# Patient Record
Sex: MALE | Race: WHITE
[De-identification: names, ages, dates, MRNs, and addresses within clinical notes are randomized per-mention and may not be internally consistent; named-entity substitution may affect disease eponyms.]

---

## 2018-01-10 ENCOUNTER — HOSPITAL ENCOUNTER (OUTPATIENT)
Dept: HOSPITAL 96 - M.PC | Age: 53
End: 2018-01-10
Payer: MEDICARE

## 2018-01-10 DIAGNOSIS — M06.842: ICD-10-CM

## 2018-01-10 DIAGNOSIS — M06.841: ICD-10-CM

## 2018-01-10 DIAGNOSIS — Z79.899: ICD-10-CM

## 2018-01-10 DIAGNOSIS — M06.852: ICD-10-CM

## 2018-01-10 DIAGNOSIS — M06.851: ICD-10-CM

## 2018-01-10 DIAGNOSIS — M54.16: Primary | ICD-10-CM

## 2018-01-12 NOTE — PAINCON
[address]  [address]                    PAIN MANAGEMENT CONSULTATION  
_______________________________________________________________________________
 
Name:       ALEXANDER BACON             Room:                      REG CHLOE MODI#:  [id number]      Account #:      [id number]  
Admission:  01/10/18     Attend Phys:    ANTONIA Desai
Discharge:               Date of Birth:  [birth year]  
         Report #: [id number]
                                                                     [id number]  
_______________________________________________________________________________
THIS REPORT FOR:  //name//                      
 
CC: Bao Singh
 
DATE OF SERVICE:  01/10/2018
 
 
The patient is a 53-year-old gentleman, typically treated for SI mediated pain,
history of lumbar radiculopathy, rheumatoid arthritis affecting hands and hips,
requiring high risk complex medication management.
 
The patient was last seen in pain clinic 11/15/2017, continued on Suboxone 8/2
once a day.
 
He returns to pain clinic today for prolonged visit, was seen from 8:00 a.m. to
8:25, greater than 50% of this visit was spent counseling the patient.
 
Incidentally, he has had an upper respiratory infection, which seems to be
resolving.  He had fallen in late October or early November with increasing low
back pain.  He is status post right percutaneous SI joint fusion (iFuse).  He
feels the pain is getting worse on the left side.  He has pain over the tailbone
as well (coccydynia?).  Some ongoing pain in the right shoulder.  I had him see
orthopedics at [facility], orthopedist at the time did an MRI and found
that there was actually no rotator cuff tear and he is now doing physical
therapy for same.
 
Again, with the patient's recent viral "bug" he has had upper respiratory issues
as well as some nausea, vomiting and diarrhea.  Due to perirectal irritation
from the diarrhea, we deferred SI exam today.  The patient does, however, have
primarily SI mediated pain on that left side, though there may be a component of
coccydynia.
 
PHYSICAL EXAMINATION:  Reveals a 5 feet 11 inches, 210 pound gentleman, BMI is
29.3 kilograms per meter squared.  Blood pressure 133/85, pulse 100,
respirations 16.  Rises from chair using armrests, again moderately antalgic
gait favoring the left leg, tenderness over the left SI joint.  Again, palpation
of the coccyx was deferred.  Lower extremity strength is generally symmetric. 
Right shoulder has diminished range of motion.
 
We did discuss at length opiate habituation and abuse concerns.  The patient has
otherwise been stable on his opiate analgesics, though was positive for THC last
year.  He has eschewed marijuana use and has been in counseling for about 6
months now.
 
Per our discussion last visit, we have elected to rotate back to OxyContin 15 mg
 
 
 
[place] [address]                    PAIN MANAGEMENT CONSULTATION  
_______________________________________________________________________________
 
Name:       ALEXANDER BACON             Room:                      REG CLI 
M.R.#:  [id number]      Account #:      [id number]  
Admission:  01/10/18     Attend Phys:    ANTONIA Desai
Discharge:               Date of Birth:  [birth year]  
         Report #: [id number]
                                                                     [id number]  
_______________________________________________________________________________
b.i.d., roughly equivalent to his Suboxone 8/2 mg from an analgesic standpoint. 
Cost concern was a significant factor, Suboxone was $200 a month and the
oxycodone will be $15.  We will trial this for 3 days and evaluate.  He asked
for a breakthrough medicine and we have elected to trial Celebrex 200 mg to be
used on a p.r.n. basis, not more than 1 a day for "breakthrough pain."
 
He will follow up in 1 month for reevaluation.  He has had x-rays of the sacrum,
which showed no fracture.  Next visit, we will consider doing either a left SI
joint injection or caudal injection for coccydynia depending on primary pain
generator as evaluated at that visit.
 
Discharged in good and stable condition after 25-minute visit, and greater than
50% of time spent in counseling the patient.
 
 
 
 
 
 
 
 
 
 
 
 
 
 
 
 
 
 
 
 
 
 
 
 
 
 
 
 
 
 
 
<ELECTRONICALLY SIGNED>
                                        By:  Lenin Singh DO        
01/12/18     0959
D: 01/10/18 1229_______________________________________
T: 01/10/18 1759Lenin Singh DO           /nt

## 2018-02-07 ENCOUNTER — HOSPITAL ENCOUNTER (OUTPATIENT)
Dept: HOSPITAL 96 - M.PC | Age: 53
Discharge: HOME | End: 2018-02-07
Payer: MEDICARE

## 2018-02-07 DIAGNOSIS — M06.862: ICD-10-CM

## 2018-02-07 DIAGNOSIS — Z79.891: ICD-10-CM

## 2018-02-07 DIAGNOSIS — M54.16: Primary | ICD-10-CM

## 2018-02-07 DIAGNOSIS — Z79.899: ICD-10-CM

## 2018-02-07 DIAGNOSIS — M06.851: ICD-10-CM

## 2018-02-07 DIAGNOSIS — M06.852: ICD-10-CM

## 2018-02-07 DIAGNOSIS — M53.3: ICD-10-CM

## 2018-02-07 DIAGNOSIS — M06.861: ICD-10-CM

## 2018-02-12 NOTE — PAINCON
[facility] 
[address] [place]  [address]                    PAIN MANAGEMENT CONSULTATION  
_______________________________________________________________________________
 
Name:       ALEXANDER BACON             Room:                      REG CHLOE MODI#:  [id number]      Account #:      [id number]  
Admission:  02/07/18     Attend Phys:    ANTONIA Desai
Discharge:               Date of Birth:  [birth year]  
         Report #: [id number]
                                                                     [id number]  
_______________________________________________________________________________
THIS REPORT FOR:  //name//                      
 
CC: Bao Singh
 
DATE OF SERVICE:  02/07/2018
 
 
The patient is a pleasant 53-year-old gentleman, long treated for lumbar
radiculopathy, SI joint dysfunction, history of rheumatoid arthritis affecting
hips and knees, requiring high risk complex medication management.  Last seen in
pain clinic on 01/10/2018.  We had rotated the patient back to OxyContin 15 mg
b.i.d. and he has done relatively well on this.  He returns to pain clinic today
noting that coccygeal pain remains problematic.  Last visit, he noted he had
fallen backwards striking his "tailbone" with ongoing pain.  We talked about
proceeding with a caudal injection today if symptoms do not improve, and they
have not.
 
The patient returns to pain clinic today noting pain remains problematic, rates
it a 3/10, but notes he has trouble sitting for any period of time.  He had
followed up with a general practice physician after his prior fall, x-rays had
shown no pathology in the tailbone though there may be some subtle dislocation,
which may be better reviewed by MRI.
 
Lower extremity strength is preserved, but with tenderness across the low back
and pelvis.  He does have some exacerbation of his chronic radicular symptoms. 
Today, we elected to move forward with caudal injection under fluoroscopy.
 
We reviewed the fact that opiate medications are being used to provide analgesia
adequate to support activities of daily living, not attempting to achieve a
specific pain score on the 0-10 Visual Analog Scale.  The current opiate
medications are providing sufficient analgesia to allow the patient to
participate in activities of daily living.  The patient is not exhibiting any
aberrant behavior suggestive of drug diversion.  The patient is not having any
adverse reactions to medications.  The patient is not suffering from daytime
somnolence or mental acuity changes.  The patient is managing opiate-induced
constipation with appropriate over-the-counter agents and dietary
considerations.  The patient was counseled on concern for caution with operating
a motor vehicle while using opiate medications.
 
A physical exam was performed and the patient's functional status was evaluated.
 All patients with back pain were advised against the bed rest greater than 4
days and were advised to return to normal activities.  Pain score assessment was
noted and the treatment plan was reviewed with the patient.  All current
medications, both prescribed and OTC were reviewed and reconciled on the
electronic medical record.  Tobacco screening was accomplished and smoking
 
 
 
[place] [address]                    PAIN MANAGEMENT CONSULTATION  
_______________________________________________________________________________
 
Name:       ALEXANDER BACON             Room:                      REG CLLIS MODI#:  [id number]      Account #:      [id number]  
Admission:  02/07/18     Attend Phys:    ANTONIA Desai
Discharge:               Date of Birth:  [birth year]  
         Report #: [id number]
                                                                     [id number]  
_______________________________________________________________________________
cessation was advised when indicated.  BMI was noted and diet/exercise
modification was recommended for all patients following outside normal
parameters.
 
I reviewed with the patient today their responsibilities to safeguard
prescription medications, reviewed their responsibility to utilize medications
only as prescribed by the physician.  They are to seek and receive pain
medications only from 1 physician group ( [facility]).  They are to use 1
pharmacy and keep the clinic informed if they change pharmacies.  Their
responsibilities include making followup visits in a timely fashion and to avoid
abrupt discontinuation of medication usage.  Their responsibilities further
include bringing their medications (bottles from the pharmacy with residual
pills) to the visit for possible confirmation of pill counts and the patient
understands it is their responsibility to submit to random drug screens to
ensure both that the medications prescribed are present, and that no other
controlled substances are present.  All prescriptions provided today were
generated electronically.
 
ASSESSMENT:  Lumbar radiculopathy, sacroiliac joint dysfunction, rheumatoid
arthritis affecting hips and knees, requiring high risk complex medication
management.
 
RECOMMENDATION:
1.  Buccal swab today.  No aberrant behavior suggestive for drug diversion,
simply complying with opiate consent to treat contract, it has been greater than
a year since our last random drug screen.
2.  Renew OxyContin 15 mg b.i.d.  I have taken the liberty of writing for 2
months of current medication.
3.  Caudal injection under fluoroscopy today.
 
PROCEDURE NOTE:  Caudal injection under fluoroscopy.
 
DESCRIPTION OF PROCEDURE:  After informed consent was obtained, the patient was
taken to the fluoroscopy suite and placed in prone position.  After sterile prep
and drape, skin was raised.  A 22-gauge stylet needle was placed through the
sacral hiatus into caudal epidural space.  Negative aspiration was accomplished.
 1 mL of Omnipaque was injected, which showed spread in the epidural space. 
This was followed with 80 mg triamcinolone plus 3 mL of 1% preservative-free
Xylocaine injected into the caudal epidural space.  Needle was removed.  The
area was cleansed, Band-Aids applied.  The patient monitored for an appropriate
period of time, discharged in good and stable condition.  Fluoroscopy time was
under 15 seconds.
 
 
<ELECTRONICALLY SIGNED>
                                        By:  Lenin Singh DO        
02/12/18     0721
D: 02/07/18 1353_______________________________________
T: 02/07/18 2225[facility] DO           /nt

## 2018-04-04 ENCOUNTER — HOSPITAL ENCOUNTER (OUTPATIENT)
Dept: HOSPITAL 96 - M.PC | Age: 53
End: 2018-04-04
Payer: MEDICARE

## 2018-04-04 DIAGNOSIS — M16.0: ICD-10-CM

## 2018-04-04 DIAGNOSIS — Z79.899: ICD-10-CM

## 2018-04-04 DIAGNOSIS — M54.16: Primary | ICD-10-CM

## 2018-04-04 DIAGNOSIS — G89.29: ICD-10-CM

## 2018-04-09 NOTE — PAINCON
[facility] 
[address] [place]  [address]                    PAIN MANAGEMENT CONSULTATION  
_______________________________________________________________________________
 
Name:       ALEXANDER BACON             Room:                      REG CHLOE MODI#:  [id number]      Account #:      [id number]  
Admission:  04/04/18     Attend Phys:    ANTONIA Desai
Discharge:               Date of Birth:  [birth year]  
         Report #: [id number]
                                                                     [id number]  
_______________________________________________________________________________
THIS REPORT FOR:  //name//                      
 
CC: Bao Singh
 
The patient is a pleasant 53-year-old gentleman being treated for symptomatic
lumbar radiculopathy, SI mediated pain, rheumatoid arthritis affecting hips and
knees, requiring complex medication management.  Last visit on 02/07/2018,
buccal swab at that time was positive for prescribed medications and no others
(oxycodone).  The patient continues on oxycodone 15 mg b.i.d.  We did a caudal
epidural injection at last visit with significant improvement in baseline pain. 
Discontinued Celebrex due to lack of efficacy.  Continued Remeron 30 mg ODT at
bedtime.  Returns to pain clinic today again noting injection afforded near 100%
relief for about 6 weeks, pain has gradually begun to recur.  He has been
sitting on a "SI pad."  Basically, this is a foam pad with a hole at the
posterior midline to relieve pain over the coccyx.  The patient states that this
has been efficacious.
 
Notes medications are helpful to enable the patient to participate in activities
of daily living.  No problems with daytime somnolence, mental acuity changes and
constipation.  The patient's position of comfort is standing.  Gait is tandem. 
Lower extremity strength is preserved.  Diffuse tenderness across the low back. 
No discrete trigger points are noted.
 
Vital signs are stable as noted in the chart.
 
We reviewed the fact that opiate medications are being used to provide analgesia
adequate to support activities of daily living, not attempting to achieve a
specific pain score on the 0-10 Visual Analog Scale.  The current opiate
medications are providing sufficient analgesia to allow the patient to
participate in activities of daily living.  The patient is not exhibiting any
aberrant behavior suggestive of drug diversion.  The patient is not having any
adverse reactions to medications.  The patient is not suffering from daytime
somnolence or mental acuity changes.  The patient is managing opiate-induced
constipation with appropriate over-the-counter agents and dietary
considerations. The patient was counseled on concern for caution with operating
a motor vehicle while using opiate medications.
 
A physical exam was performed and the patient's functional status was evaluated.
 All patients with back pain were advised against the bed rest greater than 4
days and were advised to return to normal activities.  Pain score assessment was
noted and the treatment plan was reviewed with the patient.  All current
medications, both prescribed and OTC were reviewed and reconciled on the
electronic medical record.  Tobacco screening was accomplished and smoking
cessation was advised when indicated.  BMI was noted and diet/exercise
modification was recommended for all patients following outside normal
 
 
 
[facility] 
[address] [place] [address]                    PAIN MANAGEMENT CONSULTATION  
_______________________________________________________________________________
 
Name:       ALEXANDER BACON             Room:                      REG CLI 
MKathrinaKthrin#:  [id number]      Account #:      [id number]  
Admission:  04/04/18     Attend Phys:    ANTONIA Desai
Discharge:               Date of Birth:  [birth year]  
         Report #: [id number]
                                                                     [id number]  
_______________________________________________________________________________
parameters.
 
I reviewed with the patient today their responsibilities to safeguard
prescription medications, reviewed their responsibility to utilize medications
only as prescribed by the physician.  They are to seek and receive pain
medications only from 1 physician group ([facility]).  They are to use
1 pharmacy and keep the clinic informed if they change pharmacies.  Their
responsibilities include making followup visits in a timely fashion and to avoid
abrupt discontinuation of medication usage.  Their responsibilities further
include bringing their medications (bottles from the pharmacy with residual
pills) to the visit for possible confirmation of pill counts and the patient
understands it is their responsibility to submit to random drug screens to
ensure both that the medications prescribed are present, and that no other
controlled substances are present.  All prescriptions provided today were
generated electronically.
 
ASSESSMENT:  Chronic axial back pain status post lumbar decompressive
laminectomy and fusion, sacroiliac mediated pain, requiring complex medication
management, stable on baseline medication.
 
RECOMMENDATION:  Renew oxycodone 15 mg b.i.d.  I have taken the liberty of
writing for 2 months of current medication.  Continue Remeron 30 mg ODT at
bedtime.  Follow up in 2 months for reevaluation.  Consider caudal epidural
injection as needed.
 
 
 
 
 
 
 
 
 
 
 
 
 
 
 
 
 
 
 
 
<ELECTRONICALLY SIGNED>
                                        By:  Lenin Singh DO        
04/09/18     0802
D: 04/04/18 1533_______________________________________
T: 04/04/18 2058Lenin Singh DO           /nt

## 2018-05-30 ENCOUNTER — HOSPITAL ENCOUNTER (OUTPATIENT)
Dept: HOSPITAL 96 - M.PC | Age: 53
End: 2018-05-30
Payer: MEDICARE

## 2018-05-30 DIAGNOSIS — M53.3: ICD-10-CM

## 2018-05-30 DIAGNOSIS — F41.1: ICD-10-CM

## 2018-05-30 DIAGNOSIS — G47.00: ICD-10-CM

## 2018-05-30 DIAGNOSIS — Z79.899: ICD-10-CM

## 2018-05-30 DIAGNOSIS — M06.89: Primary | ICD-10-CM

## 2018-05-30 DIAGNOSIS — M54.5: ICD-10-CM

## 2018-05-31 NOTE — PAINCON
[address]  [address]                    PAIN MANAGEMENT CONSULTATION  
_______________________________________________________________________________
 
Name:       ALEXANDER BACON             Room:                      REG CHLOE MODI#:  [id number]      Account #:      [id number]  
Admission:  05/30/18     Attend Phys:    ANTONIA Desai
Discharge:               Date of Birth:  [birth year]  
         Report #: [id number]
                                                                     [id number]  
_______________________________________________________________________________
THIS REPORT FOR:  //name//                      
 
CC: Bao Singh
 
DATE OF SERVICE:  05/30/2018
 
 
The patient is a 53-year-old gentleman long known to the pain clinic, typically
treated for lumbar radiculopathy, SI mediated pain, rheumatoid arthritis
affecting hips and knees, requiring complex medication management.  Last seen in
pain clinic 04/04/2018.  The patient was continued on oxycodone 15 mg b.i.d.,
Remeron 30 mg ODT (oral disintegrating tablet) at bedtime.  The patient has been
remarkably stable on current medication.  He tells me that his tailbone pain,
however, has become more and more problematic.  With sitting, he has significant
pain over the tailbone and concerningly, he tells me he is having some trouble
with bowel movements.  He tells me he has a hard time defecating and "cleaning
himself afterwards."  On further questioning, it sounds like he is simply
struggling with some hard stools.  Nonetheless, with increased coccydynia,
concern for myelopathic issues.  He does not appear to have saddle anesthesia
nor true bowel or bladder continence changes and no loss of proprioception of
the lower extremities.  We will order an MRI of the pelvis.  I will ask that a
copy of that result also goes to Dr. Ybarra.  We will want to look for any
obvious osseous pathology in the sacrum or coccyx.  May benefit from referral to
Neurosurgery, Juan Pablo Estrada has done multiple coccygectomies for specific
"tailbone" pain.  If, however, we can avoid surgery, though obviously be ideal.
 
The patient has had a right percutaneous SI joint fusion, which helped with some
of his SI mediated pain.  Last caudal injection 02/07/2017 did help some
decrease his overall tailbone pain.
 
We reviewed the fact that opiate medications are being used to provide analgesia
adequate to support activities of daily living, not attempting to achieve a
specific pain score on the 0-10 Visual Analog Scale.  The current opiate
medications are providing sufficient analgesia to allow the patient to
participate in activities of daily living.  The patient is not exhibiting any
aberrant behavior suggestive of drug diversion.  The patient is not having any
adverse reactions to medications.  The patient is not suffering from daytime
somnolence or mental acuity changes.  The patient is managing opiate-induced
constipation with appropriate over-the-counter agents and dietary
considerations. The patient was counseled on concern for caution with operating
a motor vehicle while using opiate medications.
A physical exam was performed and the patient's functional status was evaluated.
 All patients with back pain were advised against the bed rest greater than 4
days and were advised to return to normal activities.  Pain score assessment was
 
 
 
[facility] 
[address]  [place] [address]                    PAIN MANAGEMENT CONSULTATION  
_______________________________________________________________________________
 
Name:       ALEXANDER BACON             Room:                      REG VINNIE 
LY#:  [id number]      Account #:      [id number]  
Admission:  05/30/18     Attend Phys:    ANTONIA Desai
Discharge:               Date of Birth:  [birth year]  
         Report #: [id number]
                                                                     [id number]  
_______________________________________________________________________________
noted and the treatment plan was reviewed with the patient.  All current
medications, both prescribed and OTC were reviewed and reconciled on the
electronic medical record.  Tobacco screening was accomplished and smoking
cessation was advised when indicated.  BMI was noted and diet/exercise
modification was recommended for all patients following outside normal
parameters.
I reviewed with the patient today their responsibilities to safeguard
prescription medications, reviewed their responsibility to utilize medications
only as prescribed by the physician.  They are to seek and receive pain
medications only from 1 physician group (  [facility]).  They are to use
1 pharmacy and keep the clinic informed if they change pharmacies.  Their
responsibilities include making followup visits in a timely fashion and to avoid
abrupt discontinuation of medication usage.  Their responsibilities further
include bringing their medications (bottles from the pharmacy with residual
pills) to the visit for possible confirmation of pill counts and the patient
understands it is their responsibility to submit to random drug screens to
ensure both that the medications prescribed are present, and that no other
controlled substances are present.  All prescriptions provided today were
generated electronically.
 
PHYSICAL EXAMINATION:  Does show a 53-year-old gentleman, BMI is 30.1 kilograms
per meter squared.  Blood pressure 136/73, pulse 98, respirations are 18. 
Subjective pain score is nominal 2/10, but notes pain is more problematic when
he sits.  Alert and oriented to person, place and time, judged to be a
reasonable historian.  Rises from chair using the armrest.  Gait is tandem at
this time.  Diffuse tenderness across the low back.  No discrete trigger points
are noted.  Lower extremity strength is preserved.  Straight leg raise is
negative.
 
X-ray of the tailbone had shown no acute fracture of the tailbone.
 
ASSESSMENT:  Coccydynia, lumbar radicular pain, history of SI mediated pain,
rheumatoid arthritis affecting hips and knees, complex medication management on
low dose opiate, oxycodone 15 mg b.i.d. equating to approximately 45 mg of
morphine daily, well within the 0-50 low dose range (50-90 moderate dose and
plus 90 mg morphine equivalent high dose).  He does use Remeron oral
disintegrating tablet 30 mg at bedtime to help with insomnia and chronic
anxiety.
 
RECOMMENDATION:  We will ask Dr. Ybarra to continue managing the patient's
opiate analgesic, I am leaving the practice area and the patient has been stable
on this agent.  If, however, Dr. Ybarra is not comfortable writing for
oxycodone extended release 15 mg b.i.d. for this patient who has been remarkably
stable for quite some time, we will have him try and follow up with Dr. Juan Diego Rico who is taking over some of my patients at [facility].
 
 
 
 
[place] [address]                    PAIN MANAGEMENT CONSULTATION  
_______________________________________________________________________________
 
Name:       ALEXANDER BACON             Room:                      REG CLI 
M.R.#:  [id number]      Account #:      [id number]  
Admission:  05/30/18     Attend Phys:    ANTONIA Desai
Discharge:               Date of Birth:  [birth year]  
         Report #: [id number]
                                                                     [id number]  
_______________________________________________________________________________
Again, we will order an MRI of the pelvis.  We will have the patient follow up
with Dr. Estrada if there is any obvious anatomic pathology noted.  Discharged in
good and stable condition after prolonged visit.
 
Greater than 30 minutes was spent reviewing therapeutic options, discussing
treatment management and discussing diagnostic studies including MRI of the
pelvis and discussing transitioning care to Dr. Ybarra if he is willing.
 
 
 
 
 
 
 
 
 
 
 
 
 
 
 
 
 
 
 
 
 
 
 
 
 
 
 
 
 
 
 
 
 
 
 
 
 
<ELECTRONICALLY SIGNED>
                                        By:  Lenin Singh DO        
05/31/18     0713
D: 05/30/18 1514_______________________________________
T: 05/30/18 2347[facility]           /nt

## 2018-07-26 ENCOUNTER — HOSPITAL ENCOUNTER (OUTPATIENT)
Dept: HOSPITAL 96 - M.PC | Age: 53
End: 2018-07-26
Attending: ANESTHESIOLOGY
Payer: MEDICARE

## 2018-07-26 DIAGNOSIS — Z79.891: ICD-10-CM

## 2018-07-26 DIAGNOSIS — G89.29: ICD-10-CM

## 2018-07-26 DIAGNOSIS — E78.00: ICD-10-CM

## 2018-07-26 DIAGNOSIS — Z79.899: ICD-10-CM

## 2018-07-26 DIAGNOSIS — E78.5: ICD-10-CM

## 2018-07-26 DIAGNOSIS — M47.816: Primary | ICD-10-CM

## 2018-07-26 DIAGNOSIS — M06.9: ICD-10-CM

## 2018-08-01 NOTE — PAINCON
[address]  [address]                    PAIN MANAGEMENT CONSULTATION  
_______________________________________________________________________________
 
Name:       ARLEENADIDAMI JALLOH             Room:                      REG CHLOE MODI#:  [id number]      Account #:      [id number]  
Admission:  07/26/18     Attend Phys:    REFUGIO Rico MD   
Discharge:               Date of Birth:  [birth year]  
         Report #: [id number]
                                                                     [id number]  
_______________________________________________________________________________
THIS REPORT FOR:  //name//                      
 
CC: Bao Rico
 
DATE OF SERVICE:  07/26/2018
 
 
FOLLOWUP COMPLAINT:  Here for medication renewal.
 
FOLLOWUP HISTORY:  The patient is a 53-year-old gentleman who has been seen and
followed in the pain clinic by Dr. Lenin Singh.  This is my first time
seeing the patient, he states that he has a significant history of rheumatoid
arthritis as well as some degenerative joint disease.  He is treated with by
rheumatologist.  Notes that he has pain affecting his hips, knees, shoulders and
hands.  He has been receiving IV medications for this.  Finds that treatment
previously was helpful.  There has been some change in his medications because
of insurance reasons.  He was also using Plaquenil.  He stopped using Plaquenil
because of cost.  He noticed that his pain continues to be problematic and feel
that he may need to resume use of Plaquenil to help with his pain control.  He
rates his pain as a 3/10.  He has had a number of surgeries involving his low
back as well as shoulder surgeries.  Overall, things are going reasonably well. 
He would like to continue with his current medication regimen of oxycodone,
Remeron and finds that these medications continued to be efficacious.
 
ALLERGIES:  No known drug allergies.
 
CURRENT MEDICATIONS:  Lipitor 40 mg, folic acid 1 mg, Simponi Aria intravenous,
methotrexate 12.5 mg weekly, Remeron 30 mg at bedtime, oxycodone 15 mg b.i.d.,
Protonix 40 mg.
 
PAST MEDICAL HISTORY:  Significant for:
1.  Rheumatoid arthritis.
2.  Osteoarthritis.
3.  Hypercholesterolemia.
 
PAST SURGICAL HISTORY:  Appendectomy in 1980, hemorrhoidectomy/fissure surgery
in 1991.
 
LABORATORY DATA:  Laminectomy L3-L4 and L4-L5 on the left and diskectomy L3-L4
on the left in 1995, posterior spinal fusion at L3-L4 with autologous iliac bone
graft and pedicle screw fixation with interlocking rods and L3-L4 right
diskectomy in 1995, 1996.  Removal of screws and rods at L3 through L5 with
expiration fusion and posterior spinal fusion L5-S1 with titanium pedicle
screws, plates and autologous iliac bone graft in 2005, right elbow release in
2007, right elbow nerve removal 04/2008 right knee tear.
 
 
 
[place] [address]                    PAIN MANAGEMENT CONSULTATION  
_______________________________________________________________________________
 
Name:       ALEXANDER BACON             Room:                      REG CLI 
MKARISSA#:  [id number]      Account #:      [id number]  
Admission:  07/26/18     Attend Phys:    REFUGIO Rico MD   
Discharge:               Date of Birth:  [birth year]  
         Report #: [id number]
                                                                     [id number]  
_______________________________________________________________________________
 
LABORATORY DATA:  No new laboratory values are available.
 
PAIN CLINIC ASSESSMENT:
1.  History of osteoarthritis involving the right knee, degenerative joint
disease, pain involving the elbow
2.  Rheumatoid arthritis treated with biologic agents.
3.  Height 5 feet 11 inches, weight 250 pounds, BMI is 30.
4.  Pain score 3/10.
5.  Vital signs:  Blood pressure 131/67, heart rate 70, respiratory rate 16,
room air saturation 96%, temperature 97.7.
6.  Fall risk.  The patient has not fallen in the last 3 months.
7.  Blood thinner.  The patient is not on a blood thinning medication.
8.  Hypertension.  The patient is not being treated for hypertension.
9.  Opioid therapy.  The patient did receive his medications from 1 service
which is the pain clinic.
10.  Risk assessment tool.
11.  Functional assessment tool.
12.  Recreational drug use.  The patient denies use of recreational drugs.
13.  Tobacco:  The patient stopped smoking about 9 years ago, have a
20-pack-year history.
14.  Alcohol:  The patient denies use of alcoholic beverages.
 
PHYSICAL EXAMINATION:
GENERAL:  The patient is a well-developed, well-nourished white male, appears as
stated age.  He is alert and oriented x3.  Affect is appropriate.  Speech is
fluent.
HEAD, EYES, EARS, NOSE, AND THROAT:  Normocephalic, atraumatic.  Extraocular eye
muscles intact.  Sclerae nonicteric.
NECK:  Without adenopathy regular.
LUNGS:  Clear to auscultation.
HEART:  Regular rate.
ABDOMEN:  Nontender.
EXTREMITIES:  Upper extremity muscle strength is judged to be 4/5 for the major
muscle groups.  The patient complains of pain and discomfort in the hands
associated with arthritis.  Low back pain in the sacral lumbar area.  The
patient has a well-healed scar in the right knee area.
 
IMPRESSION:
1.  Chronic rheumatoid arthritis treated with biologic agents.
2.  Degenerative joint disease.
3.  Hyperlipidemia.
4.  Chronic opioid use secondary to chronic pain.
 
RECOMMENDATIONS:  We discussed treatment options with the patient.  He feels
that his current medication is working reasonably well.  He has no problem with
 
 
 
[place] [address]                    PAIN MANAGEMENT CONSULTATION  
_______________________________________________________________________________
 
Name:       ALEXANDER BACON             Room:                      REG CHLOE STEEN.#:  [id number]      Account #:      [id number]  
Admission:  07/26/18     Attend Phys:    REFUGIO Rico MD   
Discharge:               Date of Birth:  [birth year]  
         Report #: [id number]
                                                                     [id number]  
_______________________________________________________________________________
the medication.  He would like to continue with it.  We will continue with his
medications as he has been using them in the past.  States that he is going to
follow up in the near future with his rheumatologist.  May consider restart of
Plaquenil to help his pain.
 
We would like to thank you for letting us to participate in his care.  We hope
he continues to improve.
 
 
 
 
 
 
 
 
 
 
 
 
 
 
 
 
 
 
 
 
 
 
 
 
 
 
 
 
 
 
 
 
 
 
 
 
 
<ELECTRONICALLY SIGNED>
                                        By:  REFUGIO Rico MD            
08/01/18     1416
D: 07/26/18 0908_______________________________________
T: 07/26/18 0944N. Juan Diego iRco MD               /[facility]

## 2018-09-20 ENCOUNTER — HOSPITAL ENCOUNTER (OUTPATIENT)
Dept: HOSPITAL 96 - M.PC | Age: 53
Discharge: HOME | End: 2018-09-20
Attending: ANESTHESIOLOGY
Payer: MEDICARE

## 2018-09-20 DIAGNOSIS — M06.80: ICD-10-CM

## 2018-09-20 DIAGNOSIS — Z98.890: ICD-10-CM

## 2018-09-20 DIAGNOSIS — M53.3: Primary | ICD-10-CM

## 2018-09-20 DIAGNOSIS — M19.90: ICD-10-CM

## 2018-09-20 DIAGNOSIS — F11.90: ICD-10-CM

## 2018-10-15 NOTE — PAINCON
[address]  [address]                    PAIN MANAGEMENT CONSULTATION  
_______________________________________________________________________________
 
Name:       ALEXANDER BACON             Room:                      REG CHLOE MODI#:  [id number]      Account #:      [id number]  
Admission:  09/20/18     Attend Phys:    REFUGIO Rico MD   
Discharge:               Date of Birth:  [birth year]  
         Report #: [id number]
                                                                     [id number]  
_______________________________________________________________________________
THIS REPORT FOR:  //name//                      
 
CC: Bao Rico
 
DATE OF SERVICE:  09/20/2018
 
 
FOLLOWUP COMPLAINT:  Here for an injection.
 
FOLLOWUP HISTORY:  The patient is a 53-year-old gentleman who has been seen in
the pain clinic because of joint pain.  He suffers from rheumatoid arthritis. 
He notes that quite a number of the joints are involved.  He has noted worsening
of his pain.  Weather has changed.  It is going from a hot period of time to a
cooler period of time.  States that he has noticed a worsening of pain and
discomfort involving his right shoulder.  Also, has some back pain and
discomfort.  He is having some pain and discomfort in the low back with pain is
radiating down into his legs and involving his left hip.  He would like to
proceed with an SI joint injection.  The patient notes that he is having pain
and discomfort similar to that which he had on the right.  The patient states
that he has had surgery on his right side for SI joint stability.  He is having
less problem with that.  Notes that if he is lying in the bed, if he places his
left leg over the right knee that there is pain and discomfort down in the left
SI joint.  This was similar to what that he experienced prior to his other right
SI joint surgery.  He notes that his rheumatoid arthritis is flaring up.  States
that he is going to see his rheumatoid arthritis doctor in the next week.  Feels
that there might need to be some changes made in his medication given the
worsening of his condition at this juncture.  Rates his pain as 3/5.
 
ALLERGIES:  No known drug allergies.
 
CURRENT MEDICATIONS:  Lipitor 40 mg, folic acid 1 mg, Simponi Aria intravenous,
methotrexate 12.5 mg weekly, Remeron 30 mg at bedtime, oxycodone 15 mg b.i.d.,
Protonix 40 mg.
 
PAIN CLINIC ASSESSMENT/PQRS:
1.  History of osteoarthritis involving his knees.  The patient has some
degenerative changes in his joints.  Also, pain involving his elbows.  The
patient is being treated for rheumatoid arthritis with a biologic agent.
2.  Height 5 feet 11 inches, weight 213 pounds, BMI is 33.
3.  Blood pressure 124/84, heart rate 100, respiratory rate 16, room air
saturation 96%, temperature 97.7.  Pain intensity is 3.5/10.
4.  Fall risk.  The patient has not fallen in the last 3 months.
5.  Blood thinner.  The patient is not on a blood thinning medication.
6.  Hypertension.  The patient is not being treated for hypertension.
7.  Opioid therapy greater than 6 weeks.  The patient has received this
 
 
 
[place] [address]                    PAIN MANAGEMENT CONSULTATION  
_______________________________________________________________________________
 
Name:       ALEXANDER BACON             Room:                      REG CLI 
LY#:  [id number]      Account #:      [id number]  
Admission:  09/20/18     Attend Phys:    REFUGIO Rico MD   
Discharge:               Date of Birth:  [birth year]  
         Report #: [id number]
                                                                     [id number]  
_______________________________________________________________________________
medication to the pain clinic.
8.  Risk assessment tool.
9.  Functional assessment tool.
10.  Recreational drug use.  The patient denies use of recreational drugs.
11.  Tobacco:  The patient has not smoked in the last 14 years.
12.  Alcohol.  The patient rarely drinks an alcoholic beverage.
 
PHYSICAL EXAMINATION:
GENERAL:  The patient is a well-developed, well-nourished white male.  Appears
his stated age.  He is alert and oriented x3.  His affect is appropriate. 
Speech is fluent.
HEAD, EYES, EARS, NOSE, AND THROAT:  Normocephalic, atraumatic.  Extraocular eye
muscles intact.  Sclerae nonicteric.
NECK:  Without adenopathy or JVD.
LUNGS:  Clear to auscultation.
HEART:  Regular rate.  S1, S2.
ABDOMEN:  Nontender.
EXTREMITIES:  Upper extremities, the patient has some muscle strength judged to
be 4/5 for the major muscle groups.  Gives way secondary to problems with
complaints of pain associated with arthritis in his hands and also has pain in
the lower portion of his back and pain in the left SI joint area.  Brandon sign
is positive with increased pain in the left area.  The patient has had surgery
and stabilization of the right SI joint.  The patient has a well-healed scar on
his right knee.
 
IMPRESSION:
1.  Chronic rheumatoid arthritis treated with a biologic agent.
2.  Degenerative joint disease.
3.  Exacerbation of left SI joint.
4.  Chronic opioid use secondary to pain/complex medical management.
 
RECOMMENDATIONS:  We discussed treatment options with the patient.  We will
renew his medications.  A script for oxycodone 15 mg 1 p.o. b.i.d. has been
written.  The patient will also undergo a SI joint injection.  The patient
requests a SI joint injection.  He has noted benefit from that in the past.  He
is aware of the possible complications, which we discussed.  They include but
are not limited to infection, increased muscle limited to infection, increased
muscle soreness, numbness in the left sciatic nerve showed the local anesthetic
precipitate in that area.  He elects to proceed.
 
PROCEDURE NOTE:  The patient was placed in the prone position.  Fluoroscopy was
used to identify the left SI joint area.  This area had been sterilely prepped
with Betadine and infiltrated with 0.25 at the skin level.  A 20-gauge spinal
needle was then advanced into the area.  An injection of contrast media was
performed.  After appropriate placement, a total of 80 mg Depo-Medrol and 5 mL
of 0.5% bupivacaine was injected.  The patient tolerated the procedure well. 
 
 
 
[place] [address]                    PAIN MANAGEMENT CONSULTATION  
_______________________________________________________________________________
 
Name:       ALEXANDER BACON             Room:                      REG CLI 
M.R.#:  [id number]      Account #:      [id number]  
Admission:  09/20/18     Attend Phys:    REFUGIO Rico MD   
Discharge:               Date of Birth:  [birth year]  
         Report #: [id number]
                                                                     [id number]  
_______________________________________________________________________________
Total of 5 seconds fluoro time was used.  The patient remained in the pain
clinic for an appropriate amount of time.  He will follow up in the future as
needed.
 
We would like to thank you for letting us to participate in his care.  We hope
he continues to improve.
 
 
 
 
 
 
 
 
 
 
 
 
 
 
 
 
 
 
 
 
 
 
 
 
 
 
 
 
 
 
 
 
 
 
 
 
 
 
<ELECTRONICALLY SIGNED>
                                        By:  REFUGIO Rico MD            
10/15/18     1000
D: 09/20/18 1558_______________________________________
T: 09/20/18 1907N. Juan Diego Rico MD               /[facility]

## 2018-11-15 ENCOUNTER — HOSPITAL ENCOUNTER (OUTPATIENT)
Dept: HOSPITAL 96 - M.PC | Age: 53
Discharge: HOME | End: 2018-11-15
Attending: ANESTHESIOLOGY
Payer: MEDICARE

## 2018-11-15 DIAGNOSIS — M53.3: ICD-10-CM

## 2018-11-15 DIAGNOSIS — M19.90: ICD-10-CM

## 2018-11-15 DIAGNOSIS — G89.29: ICD-10-CM

## 2018-11-15 DIAGNOSIS — M79.18: Primary | ICD-10-CM

## 2018-11-15 DIAGNOSIS — Z79.899: ICD-10-CM

## 2018-11-15 DIAGNOSIS — M06.80: ICD-10-CM

## 2018-11-15 DIAGNOSIS — Z79.891: ICD-10-CM

## 2018-11-15 DIAGNOSIS — M17.0: ICD-10-CM

## 2019-01-10 ENCOUNTER — HOSPITAL ENCOUNTER (OUTPATIENT)
Dept: HOSPITAL 96 - M.PC | Age: 54
End: 2019-01-10
Attending: ANESTHESIOLOGY
Payer: MEDICARE

## 2019-01-10 DIAGNOSIS — M06.9: Primary | ICD-10-CM

## 2019-01-10 DIAGNOSIS — Z79.891: ICD-10-CM

## 2019-01-10 DIAGNOSIS — M79.18: ICD-10-CM

## 2019-01-10 DIAGNOSIS — Z79.899: ICD-10-CM

## 2019-01-10 DIAGNOSIS — G89.29: ICD-10-CM

## 2019-01-10 DIAGNOSIS — M25.511: ICD-10-CM

## 2019-01-10 DIAGNOSIS — M19.90: ICD-10-CM

## 2019-03-07 ENCOUNTER — HOSPITAL ENCOUNTER (OUTPATIENT)
Dept: HOSPITAL 96 - M.PC | Age: 54
Discharge: HOME | End: 2019-03-07
Attending: ANESTHESIOLOGY
Payer: MEDICARE

## 2019-03-07 DIAGNOSIS — M06.9: ICD-10-CM

## 2019-03-07 DIAGNOSIS — F11.20: ICD-10-CM

## 2019-03-07 DIAGNOSIS — M54.18: Primary | ICD-10-CM

## 2019-03-07 DIAGNOSIS — I10: ICD-10-CM

## 2019-03-07 DIAGNOSIS — M53.3: ICD-10-CM

## 2019-03-07 DIAGNOSIS — M19.90: ICD-10-CM

## 2019-03-07 DIAGNOSIS — Z79.899: ICD-10-CM

## 2019-05-02 ENCOUNTER — HOSPITAL ENCOUNTER (OUTPATIENT)
Dept: HOSPITAL 96 - M.PC | Age: 54
End: 2019-05-02
Attending: ANESTHESIOLOGY
Payer: MEDICARE

## 2019-05-02 DIAGNOSIS — M25.512: ICD-10-CM
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
## 2019-05-02 DIAGNOSIS — M79.18: ICD-10-CM

## 2019-05-02 DIAGNOSIS — M06.9: ICD-10-CM

## 2019-05-02 DIAGNOSIS — Z79.899: ICD-10-CM

## 2019-05-02 DIAGNOSIS — Z79.891: ICD-10-CM

## 2019-05-02 DIAGNOSIS — M53.3: Primary | ICD-10-CM

## 2019-05-02 DIAGNOSIS — G89.29: ICD-10-CM

## 2019-05-02 DIAGNOSIS — M19.90: ICD-10-CM

## 2019-05-02 DIAGNOSIS — M25.511: ICD-10-CM

## 2019-06-27 ENCOUNTER — HOSPITAL ENCOUNTER (OUTPATIENT)
Dept: HOSPITAL 96 - M.PC | Age: 54
End: 2019-06-27
Attending: ANESTHESIOLOGY
Payer: MEDICARE

## 2019-06-27 DIAGNOSIS — Z79.891: ICD-10-CM

## 2019-06-27 DIAGNOSIS — G89.29: ICD-10-CM

## 2019-06-27 DIAGNOSIS — M06.9: ICD-10-CM

## 2019-06-27 DIAGNOSIS — M17.11: Primary | ICD-10-CM

## 2019-06-27 DIAGNOSIS — M19.011: ICD-10-CM

## 2019-08-22 ENCOUNTER — HOSPITAL ENCOUNTER (OUTPATIENT)
Dept: HOSPITAL 96 - M.PC | Age: 54
End: 2019-08-22
Attending: ANESTHESIOLOGY
Payer: MEDICARE

## 2019-08-22 DIAGNOSIS — M54.5: Primary | ICD-10-CM

## 2019-10-17 ENCOUNTER — HOSPITAL ENCOUNTER (OUTPATIENT)
Dept: HOSPITAL 96 - M.PC | Age: 54
End: 2019-10-17
Attending: ANESTHESIOLOGY
Payer: MEDICARE

## 2019-10-17 DIAGNOSIS — G89.29: Primary | ICD-10-CM

## 2019-10-17 DIAGNOSIS — M06.9: ICD-10-CM

## 2019-10-17 DIAGNOSIS — Z79.899: ICD-10-CM

## 2019-10-17 DIAGNOSIS — M25.552: ICD-10-CM

## 2019-10-17 DIAGNOSIS — M54.5: ICD-10-CM

## 2019-10-17 DIAGNOSIS — Z79.891: ICD-10-CM

## 2019-10-17 DIAGNOSIS — M25.511: ICD-10-CM

## 2019-10-23 NOTE — PAINCON
[address]  [address]                    PAIN MANAGEMENT CONSULTATION  
_______________________________________________________________________________
 
Name:       ALEXANDER BACON             Room:                      REG CHLOE MODI#:  [id number]      Account #:      [id number]  
Admission:  10/17/19     Attend Phys:    REFUGIO Rico MD   
Discharge:               Date of Birth:  [birth year]  
         Report #: [id number]
                                                                     [id number]  
_______________________________________________________________________________
THIS REPORT FOR:  //name//                      
 
CC: Bao Hammer
 
DATE OF SERVICE:  10/17/2019
 
 
CHIEF COMPLAINT:  Back pain, tailbone pain, left hip pain, sleep apnea.
 
HISTORY:  The patient is a 54-year-old gentleman who has been followed in the
pain clinic because of chronic pain.  As you may recall, he has had pain in the
tailbone area.  He underwent treatments for this.  Overall, things have improved
in that area.  He still sees a chiropractor.  He notes that his low back pain
can be problematic.  He is having more pain at this juncture in his left hip. 
He has prioritized things and feels that the possibility of hip surgery will be
pushed into the future.  Overall, things have gone about as well as he could
expect.  He has had a new sleep apnea test.  He was found to be severely apneic.
 Had episodes of 1 minute and about 8 seconds of apnea.  Saturations dipped to
the 60s.  Had about 8 episodes of this.  He is now working with his sleep doctor
in an effort to get a more comfortable/functional sleep mask to use at night. 
Hopefully, this will help with his tiredness as well as with his health. 
Continues to be treated for rheumatoid arthritis.
 
ALLERGIES:  No known drug allergies.
 
CURRENT MEDICATIONS:  Lipitor 40 mg, folic acid 1 mg, Simponi Aria intravenous,
methotrexate 12.5 mg weekly, Remeron 30 mg at bedtime, oxycodone 15 mg b.i.d.
and Protonix 40 mg.
 
PAIN CLINIC ASSESSMENT AND PQRS:
1.  The patient has some arthritic changes in the knees.  He is also being
treated for rheumatoid arthritis with pain and discomfort from osteoarthritis in
his right shoulder.
2.  Height 5 feet 11 inches, weight 216 pounds, BMI is 30.
3.  Vital signs:  Blood pressure 126/75, heart rate 74, respiratory rate 16,
room air saturation 98% and temperature 97.8.
4.  Pain intensity 3/10.
5.  Fall history:  The patient has not fallen in the last 3 months.
6.  Blood thinner.  The patient is not on a blood thinning medication.
7.  Hypertension.  The patient is not being treated for hypertension.
8.  Opioids greater than 6 weeks.  The patient [facility].
9.  Risk assessment tool, low for opioid use.
10.  Functional assessment tool.
 
 
 
[place] [address]                    PAIN MANAGEMENT CONSULTATION  
_______________________________________________________________________________
 
Name:       ALEXANDER BACON YEIMI             Room:                      REG CHLOE MODI#:  [id number]      Account #:      [id number]  
Admission:  10/17/19     Attend Phys:    REFUGIO Rico MD   
Discharge:               Date of Birth:  [birth year]  
         Report #: [id number]
                                                                     [id number]  
_______________________________________________________________________________
11.  Recreational drug use.  The patient denies.
12.  Tobacco.  The patient does not smoke, stopped smoking 12 years ago.
13.  Alcohol.  The patient rarely drinks alcoholic beverages.
 
PHYSICAL EXAMINATION:
GENERAL:  The patient is a well-developed, well-nourished white male.  Appears
his stated age.  He is alert and oriented x 3.  His affect is appropriate. 
Speech is fluent.
HEENT:  Normocephalic, atraumatic.  Extraocular eye muscles intact.  Sclerae
nonicteric.  Mucous membranes are moist.
NECK:  Without adenopathy or JVD.
HEART:  Regular rate.
ABDOMEN:  Nontender.  Bowel sounds present.
EXTREMITIES:  Upper extremity muscle strength judged to be 5/5 for the major
muscle groups in the upper extremity.  Pain in the lower portion of his back and
shoulders.  Has had back surgery.  The patient has pain and discomfort in the
coccyx area, which has improved.  The patient continues to have sleep apnea
problems.  The patient has left hip pain.
 
IMPRESSION:
1.  Improved pelvic pain in the coccyx area with treatment from chiropractor.
2.  Chronic rheumatoid arthritis, treated with biologics.
3.  Right shoulder pain, has used Voltaren gel.
4.  Myofascial pain, left and right shoulder.
5.  Degenerative joint disease.
6.  Exacerbation of sacroiliac joint in the past.
7.  Left hip pain, possible need for hip replacement in the future.
8.  Chronic pain with opioid medications to control his pain.
 
RECOMMENDATIONS:  We discussed treatment options with the patient.  We will
continue with the patient's complex medical management of his pain with opioids.
 The patient does have some problem with sleep apnea.  He has been followed up
by his sleep apnea doctors.  Treatment modalities are now being put in place to
help with his sleep apnea.  Saturations drifted to the 60s.  Has had sleep
apneic episodes of 1 minute and 8 seconds.  States that he had about 8 episodes
during an hour.  He feels and realizes the need for the use of a sleep apnea
machine.  A script for his medications has been rewritten.  He will continue
with the Voltaren gel applied to his upper extremities.  He is also to continue
with OxyContin 15 mg one p.o. q. 8 hours p.r.n., total of 70.  The patient will
follow up in the next 2 months.
 
 
 
 
[place] [address]                    PAIN MANAGEMENT CONSULTATION  
_______________________________________________________________________________
 
Name:       ARLEENADIDAMI JALLOH             Room:                      REG CLI 
ENIO.#:  [id number]      Account #:      [id number]  
Admission:  10/17/19     Attend Phys:    REFUGIO Rico MD   
Discharge:               Date of Birth:  [birth year]  
         Report #: [id number]
                                                                     [id number]  
_______________________________________________________________________________
We would like to thank you for letting us participate in his care.  We hope he
continues to improve.
 
 
 
 
 
 
 
 
 
 
 
 
 
 
 
 
 
 
 
 
 
 
 
 
 
 
 
 
 
 
 
 
 
 
 
 
 
 
 
 
 
 
<ELECTRONICALLY SIGNED>
                                        By:  REFUGIO Rico MD            
10/23/19     0909
D: 10/17/19 0856_______________________________________
T: 10/17/19 1156N. Juan Diego Rico MD               /nt

## 2019-12-12 ENCOUNTER — HOSPITAL ENCOUNTER (OUTPATIENT)
Dept: HOSPITAL 96 - M.PC | Age: 54
End: 2019-12-12
Attending: ANESTHESIOLOGY
Payer: MEDICARE

## 2019-12-12 DIAGNOSIS — R10.2: Primary | ICD-10-CM

## 2019-12-12 DIAGNOSIS — G89.29: ICD-10-CM

## 2019-12-12 DIAGNOSIS — M79.18: ICD-10-CM

## 2019-12-12 DIAGNOSIS — M25.512: ICD-10-CM

## 2019-12-12 DIAGNOSIS — M19.90: ICD-10-CM

## 2019-12-18 NOTE — PAINCON
[address]  [address]                    PAIN MANAGEMENT CONSULTATION  
_______________________________________________________________________________
 
Name:       ALEXANDER BACON             Room:                      REG CHLOE MODI#:  [id number]      Account #:      [id number]  
Admission:  12/12/19     Attend Phys:    REFUGIO Rico MD   
Discharge:               Date of Birth:  [birth year]  
         Report #: [id number]
                                                                     [id number]  
_______________________________________________________________________________
THIS REPORT FOR:  //name//                      
 
CC: Bao Hammer
 
DATE OF SERVICE:  12/12/2019
 
 
CHIEF COMPLAINT:  "I had my shoulder surgery and they did not have to do as much
as they thought they were going to, I am doing better."
 
HISTORY:  The patient is a 54-year-old gentleman who has been followed in the
pain clinic because of chronic pain.  He has undergone surgery in his right
shoulder.  Still has some pain and discomfort in his low back at tailbone area. 
He has some pain in the left hip area.  These have all been ongoing for a number
of years.  Since 10/30, he has been rehabbing his shoulder.  He has undergone
physical therapy.  He also has scheduled for colonoscopy in the upcoming year. 
Rates his pain as a 2/10.  Overall, things have gone relatively well.  He feels
his oxycodone medication continues to be beneficial.  He also feels that the
Remeron is helpful at bedtime.  He notes some increased discomfort because of
the cold weather.  Walking, sitting, standing and other activity is somewhat
more problematic.  He has taken his medications.  He is not having any side
effects.
 
ALLERGIES:  No known drug allergies.
 
CURRENT MEDICATIONS:
1.  Lipitor 40 mg, folic acid 10 mg, Simponi Aria intravenous for rheumatoid
arthritis.
2.  Methotrexate 12.5 mg weekly, Remeron 30 mg at bedtime, oxycodone 15 mg
b.i.d. and Protonix 40 mg.
 
PAIN CLINIC ASSESSMENT/PQRS:
1.  The patient has some arthritic changes in the knee.  He has been treated for
rheumatoid arthritis.  He has some osteoarthritic changes in his right shoulder.
2.  Height 5 feet 11 inches, weight 218 pounds, BMI is 30.
3.  Vital Signs:  Blood pressure 125/79, heart rate 91, respiratory rate 16,
room air saturation 96%, temperature was 97.8.  Pain score is 2/10.
4.  Fall history:  The patient has not fallen in the last 3 months.
5.  Blood thinner.  The patient is not on a blood thinning medication.
6.  Hypertension.  The patient is not being treated for hypertension.
7.  Opioids greater than 6 weeks.  The patient received medication from [facility]
source, pain clinic.
8.  Risk assessment tool, low for opioid use.
9.  Functional assessment tool reviewed.
 
 
 
[place] [address]                    PAIN MANAGEMENT CONSULTATION  
_______________________________________________________________________________
 
Name:       ALEXANDER BACON             Room:                      REG CLI 
M.R.#:  [id number]      Account #:      [id number]  
Admission:  12/12/19     Attend Phys:    REFUGIO Rico MD   
Discharge:               Date of Birth:  [birth year]  
         Report #: [id number]
                                                                     [id number]  
_______________________________________________________________________________
10.  Recreational drug use.  The patient denies tobacco:  The patient does not
smoke, stopped smoking about 12 years ago.
11.  Alcohol:  The patient rarely drinks alcoholic beverages.
 
PHYSICAL EXAMINATION:
GENERAL:  The patient is a well-developed, well-nourished white male.  Appears
his stated age.  He is alert and oriented x 3.  His affect is appropriate. 
Speech is fluent.
HEENT:  Normocephalic, atraumatic.  Extraocular eye muscles intact.  Sclerae
nonicteric.  Mucous membranes are moist.
NECK:  Without adenopathy or JVD.  Has some increased pain in the right
shoulder.  Has increased mobility, is able to raise his shoulder and arm with
less discomfort.
HEART:  Regular rate.
ABDOMEN:  Nontender.  Bowel sounds present.
EXTREMITIES:  Upper extremity muscle strength 5/5 on the left side and 4+/5 on
the right.  The patient has some pain and discomfort in the coccyx area, which
has improved over time.  Has some left hip pain.
 
IMPRESSION:
1.  Improved pelvic pain in the coccygeal area with treatment from chiropractor.
2.  Chronic rheumatoid arthritis treated with biologics.
3.  Right shoulder pain status post shoulder surgery with less pathology than
was thought to exist.
4.  Myofascial pain.
5.  Degenerative joint disease.
6.  Exacerbation of sacroiliac joint disease in the past.
7.  Left hip pain, possible need for hip replacement in the future.
8.  Chronic pain treated with opioid medications.
 
RECOMMENDATIONS:  We discussed treatment options with the patient.  At this
juncture, he feels medications are working reasonably well.  He does not have
any problems with it.  He has had less pain and he thought it was after the
surgery.  There was less pathology.  He only had some encumbrance in his
shoulder, which was taken care of.  At this juncture, he continues with physical
therapy.  He feels medications are helpful.  He does not have any problems with
his medications.  He is aware that opioid medications can be less effective over
time because of development of tolerance.  He has not shown any signs of
addiction.  He has taken the medication as prescribed.  He is aware that opioid
medications have been the cause of 70,000 people's death last year, because of
overdosing.  He feels that they are worthwhile for him.  He would like to have
his medications renewed.  A script for oxycodone 15 mg 1 q.8 hours p.r.n. has
been written.  The patient will also continue with Remeron at bedtime.  He will
call us if he has any concerns.
 
 
 
 
[place] [address]                    PAIN MANAGEMENT CONSULTATION  
_______________________________________________________________________________
 
Name:       ALEXANDER BACON             Room:                      REG CLI 
M.R.#:  [id number]      Account #:      [id number]  
Admission:  12/12/19     Attend Phys:    REFUGIO Rico MD   
Discharge:               Date of Birth:  [birth year]  
         Report #: [id number]
                                                                     [id number]  
_______________________________________________________________________________
We would like to thank you for letting us participate in his care.  We hope he
continues to improve.
 
 
 
 
 
 
 
 
 
 
 
 
 
 
 
 
 
 
 
 
 
 
 
 
 
 
 
 
 
 
 
 
 
 
 
 
 
 
 
 
 
 
<ELECTRONICALLY SIGNED>
                                        By:  REFUGIO Rico MD            
12/18/19     1330
D: 12/12/19 0836_______________________________________
T: 12/12/19 0906N. Juan Diego Rico MD               /nt

## 2020-02-06 ENCOUNTER — HOSPITAL ENCOUNTER (OUTPATIENT)
Dept: HOSPITAL 96 - M.PC | Age: 55
Discharge: HOME | End: 2020-02-06
Attending: ANESTHESIOLOGY
Payer: MEDICARE

## 2020-02-06 DIAGNOSIS — Z98.890: ICD-10-CM

## 2020-02-06 DIAGNOSIS — M19.90: ICD-10-CM

## 2020-02-06 DIAGNOSIS — M25.511: ICD-10-CM

## 2020-02-06 DIAGNOSIS — Z79.899: ICD-10-CM

## 2020-02-06 DIAGNOSIS — G89.29: ICD-10-CM

## 2020-02-06 DIAGNOSIS — M06.9: ICD-10-CM

## 2020-02-06 DIAGNOSIS — Z79.891: ICD-10-CM

## 2020-02-06 DIAGNOSIS — M25.552: ICD-10-CM

## 2020-02-06 DIAGNOSIS — M79.18: Primary | ICD-10-CM

## 2020-02-11 NOTE — PAINCON
[address]  [address]                    PAIN MANAGEMENT CONSULTATION  
_______________________________________________________________________________
 
Name:       ALEXANDER BACON             Room:                      REG CHLOE MODI#:  [id number]      Account #:      [id number]  
Admission:  02/06/20     Attend Phys:    REFUGIO Rico MD   
Discharge:               Date of Birth:  [birth year]  
         Report #: [id number]
                                                                     [id number]  
_______________________________________________________________________________
THIS REPORT FOR:  //name//                      
 
cc:  Bao Ybarra Louis D. DO ~
THIS REPORT FOR:  //name//                      
 
CC: Bao Hammer
 
DATE OF SERVICE:  02/06/2020
 
 
The patient was seen by on 02/06 by Juan Diego Rico.
 
CHIEF COMPLAINT:  Increased pain in the low back area.  This has been really bad
for the last few weeks.
 
HISTORY:  The patient is a 55-year-old gentleman who has been followed in the
pain clinic because of chronic pain in the low back area.  He did have problems
with coccygeal pain.  Overall, things have improved.  He does have some problems
with rheumatoid arthritis and is being treated with biologics.  He has some pain
in his right shoulder.  He has had right shoulder pain.  He has some myofascial
pain as well.  He recently started to note this pain and discomfort about 6
weeks ago.  Usually, he has episodes where the pain becomes problematic, but
this pain has been unrelenting on the right lower back area.  It is sore to
touch.  Notices he has difficulty walking his dog because of the pain.  He has
returned today with the hopes of renewing his medications and the possibility of
having an injection in the trigger point area of his back.
 
ALLERGIES:  No known drug allergies.
 
CURRENT MEDICATIONS:
1.  Lipitor 40 mg.
2.  Folic acid 10 mg.
3.  Simponi Aria intravenous for rheumatoid arthritis.
4.  Methotrexate 12.5 mg weekly.
5.  Remeron 30 mg at bedtime.
6.  Oxycodone 15 mg b.i.d.
7.  Protonix 40 mg.
 
PAIN CLINIC ASSESSMENT/PQRS:
1.  The patient has some arthritic changes in his knees.  He has been treated
for rheumatoid arthritis, has some osteoarthritic changes in his shoulders as
well.
2.  Height 5 feet 11 inches, weight 224 pounds, BMI is 31.5.
 
 
 
[place] [address]                    PAIN MANAGEMENT CONSULTATION  
_______________________________________________________________________________
 
Name:       ALEXANDER BACON             Room:                      REG CLI 
M.R.#:  [id number]      Account #:      [id number]  
Admission:  02/06/20     Attend Phys:    REFUGIO Rico MD   
Discharge:               Date of Birth:  [birth year]  
         Report #: [id number]
                                                                     [id number]  
_______________________________________________________________________________
3.  Vital Signs:  Blood pressure 123/70, heart rate 79, respiratory rate 16,
room air saturation is 94%, and temperature 97.7.
4.  Pain intensity 3/10.
5.  Fall history:  The patient has not fallen in the last 3 months.
6.  Blood thinner.  The patient is not on a blood thinning medication.
7.  Hypertension.  The patient is not being treated for hypertension.
8.  Opioids greater than 6 weeks.  The patient received medication from one
source, the pain clinic.
9.  Risk assessment tool.
10.  Functional assessment tool reviewed.
11.  Recreational drug use:  The patient denies.
12.  Tobacco:  The patient does not smoke, stopped smoking 12 years ago.
13.  Alcohol:  The patient rarely drinks alcoholic beverages.
 
PHYSICAL EXAMINATION:
GENERAL:  The patient is a well-developed, well-nourished white male who appears
his stated age.  He is alert and oriented x 3.  His affect is appropriate. 
Speech is fluent.
HEENT:  Normocephalic, atraumatic.  Extraocular eye muscles intact.  Sclerae
nonicteric.  Mucous membranes are moist.
NECK:  Without adenopathy or JVD.  The patient has some increased discomfort
when he raises his shoulder.
HEART:  Regular rate.
ABDOMEN:  Nontender.  Bowel sounds present.
EXTREMITIES:  Upper extremity muscle strength is 5-/5 on the left and 4+/5 on
the right.  The patient has some discomfort in the low back area.  Palpation in
the area of the right posterior superior iliac spine area does reproduce pain
and discomfort and reproduces the pain and discomfort he is complaining about
most today.
 
IMPRESSION:
1.  Myofascial pain, right low back area.
2.  History of pelvic pain in the coccygeal area treated in the past with
chiropractic treatment.
3.  Chronic rheumatoid arthritis treated with biologics.
4.  Right shoulder pain status post shoulder surgery with less pathology.
5.  Myofascial pain.
6.  Degenerative joint disease.
7.  Exacerbation of SI joint disease in the past.
8.  Left hip pain, possible need for hip replacement in the future.
9.  Chronic pain treated with opioid medications.
 
RECOMMENDATIONS:  We discussed treatment options with the patient.  He is having
pain and discomfort in lower portion of his back.  Palpation in this area causes
extreme reproduction of discomfort.  He has limited movement with walking,
sitting, standing, climbing stairs, moving back and forth, lifting and bending. 
 
 
 
[address]  [address]                    PAIN MANAGEMENT CONSULTATION  
_______________________________________________________________________________
 
Name:       ALEXANDER BACON             Room:                      REG CLI 
M.YOLANDA#:  [id number]      Account #:      [id number]  
Admission:  02/06/20     Attend Phys:    REFUGIO Rico MD   
Discharge:               Date of Birth:  [birth year]  
         Report #: [id number]
                                                                     [id number]  
_______________________________________________________________________________
He has found benefit from heat and cold on the back area.  He would like to
proceed with a trigger point injection to help decrease the pain he has been
experiencing for about the last 6 weeks.
 
DISCUSSION:  We discussed treatment options with the patient, possible
complications of the procedure or infection, bleeding, increased muscle
soreness, nerve damage, worsening of pain, no improvement in pain and the
patient elects to proceed.
 
PROCEDURE NOTE:  The patient was taken to the procedure area.  He was then
assisted in getting on the examination table.  His back was sterilely prepped
with a Betadine solution.  The right posterior superior iliac spine area was
then palpated.  The patient indicated that we were in the area of the trigger
point.  His back had been sterilely prepped with a chlorhexidine solution and
allowed to dry.  A 25-gauge needle was then advanced into the area of the
discomfort.  Aspiration was negative.  Total of 80 mg Depo-Medrol and 8 mL of
0.5% bupivacaine was injected.  The patient tolerated the procedure well.  He
remained in the pain clinic for an appropriate amount of time.  He will follow
up in the future as needed.  A renewal of his medications has been performed. 
He has been provided with OxyContin 15 mg one p.o. q. 8 hours p.r.n., total of
70 tablets and Voltaren gel 1% to apply to the affected area 4 times daily.  The
patient will also continue with Remeron.  He will call us if he has any
concerns.
 
We would like to thank you for letting us participate in his care.  We hope he
continues to improve.
 
 
 
 
 
 
 
 
 
 
 
 
 
 
 
 
 
 
<ELECTRONICALLY SIGNED>
                                        By:  REFUGIO Rico MD            
02/11/20     0837
D: 02/06/20 1516_______________________________________
T: 02/07/20 0037N. Juan Diego Rico MD               /nt

## 2020-04-02 ENCOUNTER — HOSPITAL ENCOUNTER (OUTPATIENT)
Dept: HOSPITAL 96 - M.PC | Age: 55
End: 2020-04-02
Attending: ANESTHESIOLOGY
Payer: MEDICARE

## 2020-04-02 DIAGNOSIS — Z79.899: ICD-10-CM

## 2020-04-02 DIAGNOSIS — M19.90: ICD-10-CM

## 2020-04-02 DIAGNOSIS — M79.18: ICD-10-CM

## 2020-04-02 DIAGNOSIS — M25.552: ICD-10-CM

## 2020-04-02 DIAGNOSIS — Z79.84: ICD-10-CM

## 2020-04-02 DIAGNOSIS — M06.9: ICD-10-CM

## 2020-04-02 DIAGNOSIS — F11.20: ICD-10-CM

## 2020-04-02 DIAGNOSIS — M54.5: Primary | ICD-10-CM

## 2020-04-02 DIAGNOSIS — M25.511: ICD-10-CM

## 2020-04-02 DIAGNOSIS — Z87.39: ICD-10-CM

## 2020-05-28 ENCOUNTER — HOSPITAL ENCOUNTER (OUTPATIENT)
Dept: HOSPITAL 96 - M.PC | Age: 55
End: 2020-05-28
Attending: ANESTHESIOLOGY
Payer: MEDICARE

## 2020-05-28 DIAGNOSIS — Z87.891: ICD-10-CM

## 2020-05-28 DIAGNOSIS — Z96.642: ICD-10-CM

## 2020-05-28 DIAGNOSIS — M46.1: ICD-10-CM

## 2020-05-28 DIAGNOSIS — M79.10: ICD-10-CM

## 2020-05-28 DIAGNOSIS — M54.5: Primary | ICD-10-CM

## 2020-05-28 DIAGNOSIS — Z79.899: ICD-10-CM

## 2020-05-28 DIAGNOSIS — M25.511: ICD-10-CM

## 2020-05-28 DIAGNOSIS — F11.20: ICD-10-CM

## 2020-05-28 DIAGNOSIS — I10: ICD-10-CM

## 2020-05-28 DIAGNOSIS — M06.9: ICD-10-CM

## 2020-05-28 DIAGNOSIS — G89.4: ICD-10-CM

## 2020-05-28 DIAGNOSIS — M19.90: ICD-10-CM

## 2020-07-23 ENCOUNTER — HOSPITAL ENCOUNTER (OUTPATIENT)
Dept: HOSPITAL 96 - M.PC | Age: 55
End: 2020-07-23
Attending: ANESTHESIOLOGY
Payer: MEDICARE

## 2020-07-23 DIAGNOSIS — F11.90: ICD-10-CM

## 2020-07-23 DIAGNOSIS — M25.511: ICD-10-CM

## 2020-07-23 DIAGNOSIS — M25.551: ICD-10-CM

## 2020-07-23 DIAGNOSIS — M79.18: Primary | ICD-10-CM

## 2020-07-23 DIAGNOSIS — M19.90: ICD-10-CM

## 2020-07-24 NOTE — PAINCON
[facility] 
[address] [place]  [address]                    PAIN MANAGEMENT CONSULTATION  
_______________________________________________________________________________
 
Name:       ARLEENALEXANDER YEIMI             Room:                      REG CHLOE MODI#:  [id number]      Account #:      [id number]  
Admission:  07/23/20     Attend Phys:    REFUGIO Rico MD   
Discharge:               Date of Birth:  [birth year]  
         Report #: [id number]
                                                                     [id number]  
_______________________________________________________________________________
THIS REPORT FOR:  //name//                      
 
cc:  Bao Ybarra Louis D. DO                                              ~
THIS REPORT FOR:  //name//                      
 
CC: Bao Rico
 
DATE OF SERVICE:  07/23/2020
 
 
CHIEF COMPLAINT:  "Things are going pretty well.  I am somewhat sore.  I had a
long motorcycle ride from [place]."
 
HISTORY:  The patient is a 55-year-old gentleman who has been followed in the
pain clinic.  As you recall, he has a long history of chronic back pain.  He
suffered from coccygeal pain.  At this juncture has improved.  He also has
problems with rheumatoid arthritis.  He has seen a rheumatologist.  He has been
treated with biological agents.  He still has pain in the right shoulder.  He
has had his right shoulder repaired.  He continues to note some increased range
of motion in his arm.  He recently went to [place], [place].  He generally rides
with a group of motorcycle Veterans.  They accompany those veterans who have
lost their lives.  Because of the [facility] airport construction, the plane was unable
to fly in the [place].  He rode his bike from [place], [place] back to [facility].  He is somewhat sore as a result of that.  He has been wearing a mask.  He
is following the CDC instructions regarding the COVID-19.
 
ALLERGIES:  No known drug allergies.
 
CURRENT MEDICATIONS:
1.  Lipitor 40 mg.
2.  Folic acid 10 mg.
3.  Simponi intravenous for rheumatoid arthritis.
4.  Methotrexate 12.5 mg weekly.
5.  Remeron 30 mg at bedtime.
6.  Oxycodone 15 mg b.i.d.
7.  Protonix 40 mg.
 
PAIN CLINIC ASSESSMENT AND PQRS:
1.  The patient has some osteoarthritic changes involving his knees.  He is
being treated for rheumatoid arthritis.  He has had some osteoarthritic changes
in his right shoulder.
2.  Height 5 feet 11 inches, weight 217 pounds, BMI is 30.
3.  Vital Signs:  Blood pressure 116/73, heart rate 85, respiratory rate 20,
room air saturation 94%, temperature 99.2.
 
 
 
[place] [address]                    PAIN MANAGEMENT CONSULTATION  
_______________________________________________________________________________
 
Name:       ALEXANDER BACON             Room:                      REG CLI 
M.R.#:  [id number]      Account #:      [id number]  
Admission:  07/23/20     Attend Phys:    REFUGIO Rico MD   
Discharge:               Date of Birth:  [birth year]  
         Report #: [id number]
                                                                     [id number]  
_______________________________________________________________________________
4.  Pain score:  3/10.
5.  Fall history:  The patient has not fallen in the last 3 months.
6.  Blood thinner:  The patient is not on a blood thinning medication.
7.  Opioids greater than 6 weeks:  The patient received medication from one
source the pain clinic.
8.  Risk assessment tool:  Low for opioid use.
9.  Recreational drug use:  The patient denies.
10.  Tobacco:  The patient stopped smoking 12 years ago.
11.  Alcohol:  The patient rarely drinks alcoholic beverages.
 
PHYSICAL EXAMINATION:
GENERAL:  The patient is a well-developed, well-nourished white male.  Appears
his stated age.  He is alert and oriented x 3.  His affect is appropriate. 
Speech is fluent.
HEENT:  Normocephalic, atraumatic.  Extraocular eye muscles are intact.  The
patient is wearing a mask.
NECK:  Without adenopathy or JVD.  The patient has some pain in his right
shoulder area.
HEART:  Regular rate.
LUNGS:  Clear.
ABDOMEN:  Nontender.
EXTREMITIES:  Upper extremity muscle strength judged to be 4+/5 for the major
muscle groups on the right side.  Lower extremity muscle strength 5/5 for the
major muscle groups in the lower extremity.  The patient does complain of some
pain in the back after the prolonged motorcycle ride.
 
IMPRESSION:
1.  Myofascial pain, low back area.
2.  History of pelvic pain.  Did suffer from coccygeal pain, improved after
chiropractic treatment.
3.  Chronic rheumatoid arthritis, treated with biologics.
4.  Right shoulder pain, improving.
5.  Myofascial pain.
6.  Degenerative joint disease.
7.  History of sacroiliac joint problems.
8.  Left hip pain, might have a replacement in the future.
9.  Chronic pain, treated with opioid medications to help control his
discomfort.
 
RECOMMENDATIONS:  We discussed treatment options with the patient.  At this
juncture, we will continue with his medications.  He finds that the medications
are helpful.  He has had no problems with their use.  He continues to stay
active.  He will continue with oxycodone 15 mg 1 p.o. b.i.d.  He will call us if
he has any concerns.  He has not shown any signs of overt use of the medication.
 He has not shown any signs of dependency.  A script for his medications has
been provided.  He will continue with OxyContin 15 mg t.i.d. when needed.
 
 
 
[facility] [address] [place] [address]                    PAIN MANAGEMENT CONSULTATION  
_______________________________________________________________________________
 
Name:       ALEXANDER BACON             Room:                      REG CLI 
LY#:  [id number]      Account #:      [id number]  
Admission:  07/23/20     Attend Phys:    REFUGIO Rico MD   
Discharge:               Date of Birth:  [birth year]  
         Report #: [id number]
                                                                     [id number]  
_______________________________________________________________________________
 
We would like to thank you for letting us participate in his care.  We hope he
continues to improve.
 
 
 
 
 
 
 
 
 
 
 
 
 
 
 
 
 
 
 
 
 
 
 
 
 
 
 
 
 
 
 
 
 
 
 
 
 
 
 
 
 
<ELECTRONICALLY SIGNED>
                                        By:  REFUGIO Rico MD            
07/24/20     0902
D: 07/23/20 0951_______________________________________
T: 07/23/20 1722N. Juan Diego Rico MD               /nt

## 2020-09-17 ENCOUNTER — HOSPITAL ENCOUNTER (OUTPATIENT)
Dept: HOSPITAL 96 - M.PC | Age: 55
End: 2020-09-17
Attending: ANESTHESIOLOGY
Payer: MEDICARE

## 2020-09-17 DIAGNOSIS — Z79.899: ICD-10-CM

## 2020-09-17 DIAGNOSIS — M54.5: ICD-10-CM

## 2020-09-17 DIAGNOSIS — Z79.891: ICD-10-CM

## 2020-09-17 DIAGNOSIS — M06.9: ICD-10-CM

## 2020-09-17 DIAGNOSIS — G89.29: Primary | ICD-10-CM

## 2020-09-17 DIAGNOSIS — M25.511: ICD-10-CM

## 2020-09-17 DIAGNOSIS — M25.552: ICD-10-CM

## 2020-09-17 DIAGNOSIS — M79.18: ICD-10-CM

## 2020-09-29 NOTE — PAINCON
[address]  [address]                    PAIN MANAGEMENT CONSULTATION  
_______________________________________________________________________________
 
Name:       ALEXANDER BACON             Room:                      REG CHLOE MODI#:  [id number]      Account #:      [id number]  
Admission:  09/17/20     Attend Phys:    REFUGIO Rico MD   
Discharge:               Date of Birth:  [birth year]  
         Report #: [id number]
                                                                     [id number]  
_______________________________________________________________________________
THIS REPORT FOR:  //name//                      
 
cc:  Bao Ybarra Louis D. DO                                              ~
THIS REPORT FOR:  //name//                      
 
CC: Bao Rico
 
DATE OF SERVICE:  09/17/2020
 
 
CHIEF COMPLAINT:  Low back and tailbone pain.
 
HISTORY:  The patient is a 55-year-old gentleman who has been followed in the
pain clinic.  As you recall, he has a history of chronic low back pain.  In the
past, he suffered from coccygeal pain.  At this point, his pain has been
reasonably controlled with his current medical regimen.  He has returned today
with the hopes of having his medications renewed.  He rates his pain as a
3.5/10.
 
ALLERGIES:  No known drug allergies.
 
CURRENT MEDICATIONS:  Lipitor 40 mg, folic acid 10 mg, Simponi intravenous for
rheumatoid arthritis.  Methotrexate 12.5 mg weekly, Remeron 30 mg at bedtime,
oxycodone 15 mg b.i.d., Protonix.
 
PAIN CLINIC ASSESSMENT AND PQRS:
1.  The patient has some osteoarthritic changes in his knees.  He is being
treated for rheumatoid arthritis.  He has had osteoarthritic changes in his
shoulder.
2.  Height 5 feet 11 inches, weight 216 pounds, BMI is 30.
3.  Vital Signs:  Blood pressure 119/52, heart rate 69, respiratory rate 18,
room air saturation 96, temperature is 97.8.
4.  Pain intensity 3.5/10.
5.  Fall history:  The patient has not fallen in the last 3 months.
6.  Blood thinner.  The patient is not on a blood thinning medication.
7.  Opioid use.  The patient receives medication from the pain clinic.
8.  Risk assessment tool, low for opioid use.
9.  Recreational drug use.  The patient denies.
10.  Tobacco:  The patient stopped smoking 12 years ago.
11.  Alcohol.  The patient ____ drinks alcoholic beverages.
 
PHYSICAL EXAMINATION:
GENERAL:  The patient is a well-developed, well-nourished white male.  Appears
his stated age.  He is alert and oriented x 3.  His affect is appropriate. 
 
 
 
[place] [address]                    PAIN MANAGEMENT CONSULTATION  
_______________________________________________________________________________
 
Name:       ARLEENADIDAMI JALLOH             Room:                      REG CLI 
M.R.#:  [id number]      Account #:      [id number]  
Admission:  09/17/20     Attend Phys:    REFUGIO Rico MD   
Discharge:               Date of Birth:  [birth year]  
         Report #: [id number]
                                                                     [id number]  
_______________________________________________________________________________
Speech is fluent.
HEENT:  Normocephalic, atraumatic.  Extraocular eye muscles intact.  The patient
is wearing a facial covering.
NECK:  Without adenopathy or JVD.  The patient has some right shoulder pain.
HEART:  Regular rate.
LUNGS:  Clear to auscultation.
ABDOMEN:  Nontender.
EXTREMITIES:  Upper extremity muscle strength judged to be 4+/5 for the major
muscle groups in the upper body on the right.  The patient has muscle strength
in the lower extremities 5/5 for the major muscle groups in the lower extremity.
 The patient has some pain in the back.
 
IMPRESSION:
1.  History of myofascial pain in the low back area.
2.  History of pelvic pain after suffering from coccygeal pain improved after
chiropractic treatment.
3.  Chronic rheumatoid arthritis treated with biologics.
4.  Right shoulder pain, improving.
5.  Myofascial pain.
6.  Degenerative joint disease.
7.  History of sacroiliac joint problems.
8.  Left hip pain.  May decide for hip replacement in the future.
9.  Chronic pain treated with opioid medications.
 
RECOMMENDATIONS:  We discussed the treatment options with the patient.  Risks
and benefits of opioid medications again were discussed.  They can be less
effective as time goes on because of development of tolerance.  The patient
feels medications are working reasonably well.  A script for his medications of
oxycodone 15 mg 1 p.o. b.i.d. has been provided.  The patient will call us if he
has any concerns.  He is aware that some people become addicted to the
medication.  He has not shown any signs of addiction.
 
 
 
 
 
 
 
 
 
 
 
 
 
<ELECTRONICALLY SIGNED>
                                        By:  REFUGIO Rico MD            
09/29/20     1331
D: 09/28/20 2132_______________________________________
T: 09/29/20 0806N. Juan Diego Rico MD               /nt

## 2020-11-12 ENCOUNTER — HOSPITAL ENCOUNTER (OUTPATIENT)
Dept: HOSPITAL 96 - M.PC | Age: 55
End: 2020-11-12
Attending: ANESTHESIOLOGY
Payer: MEDICARE

## 2020-11-12 DIAGNOSIS — G89.29: ICD-10-CM

## 2020-11-12 DIAGNOSIS — M06.8A: Primary | ICD-10-CM

## 2021-01-07 ENCOUNTER — HOSPITAL ENCOUNTER (OUTPATIENT)
Dept: HOSPITAL 96 - M.PC | Age: 56
End: 2021-01-07
Attending: ANESTHESIOLOGY
Payer: MEDICARE

## 2021-01-07 DIAGNOSIS — M25.552: ICD-10-CM

## 2021-01-07 DIAGNOSIS — F11.20: ICD-10-CM

## 2021-01-07 DIAGNOSIS — Z87.39: ICD-10-CM

## 2021-01-07 DIAGNOSIS — M79.10: ICD-10-CM

## 2021-01-07 DIAGNOSIS — J98.8: ICD-10-CM

## 2021-01-07 DIAGNOSIS — M19.90: ICD-10-CM

## 2021-01-07 DIAGNOSIS — M06.9: ICD-10-CM

## 2021-01-07 DIAGNOSIS — M54.5: Primary | ICD-10-CM

## 2021-01-07 DIAGNOSIS — M25.511: ICD-10-CM

## 2021-01-26 ENCOUNTER — HOSPITAL ENCOUNTER (OUTPATIENT)
Dept: HOSPITAL 35 - SJCVCIMAG | Age: 56
End: 2021-01-26
Attending: INTERNAL MEDICINE
Payer: COMMERCIAL

## 2021-01-26 DIAGNOSIS — I42.9: ICD-10-CM

## 2021-01-26 DIAGNOSIS — R00.0: Primary | ICD-10-CM

## 2021-02-04 ENCOUNTER — HOSPITAL ENCOUNTER (OUTPATIENT)
Dept: HOSPITAL 35 - SJCVCIMAG | Age: 56
End: 2021-02-04
Attending: INTERNAL MEDICINE
Payer: COMMERCIAL

## 2021-02-04 DIAGNOSIS — E06.9: ICD-10-CM

## 2021-02-04 DIAGNOSIS — M79.605: ICD-10-CM

## 2021-02-04 DIAGNOSIS — E04.2: Primary | ICD-10-CM

## 2021-02-04 DIAGNOSIS — Z79.899: ICD-10-CM

## 2021-02-04 DIAGNOSIS — I26.99: ICD-10-CM

## 2021-02-04 DIAGNOSIS — Z87.891: ICD-10-CM

## 2021-02-04 DIAGNOSIS — M79.604: ICD-10-CM

## 2021-02-04 DIAGNOSIS — M79.89: ICD-10-CM

## 2021-02-04 DIAGNOSIS — E07.89: ICD-10-CM

## 2021-02-24 ENCOUNTER — HOSPITAL ENCOUNTER (OUTPATIENT)
Dept: HOSPITAL 35 - CAT | Age: 56
End: 2021-02-24
Attending: INTERNAL MEDICINE
Payer: COMMERCIAL

## 2021-02-24 DIAGNOSIS — Z12.2: Primary | ICD-10-CM

## 2021-02-24 DIAGNOSIS — J98.4: ICD-10-CM

## 2021-02-24 DIAGNOSIS — R91.1: ICD-10-CM

## 2021-03-04 ENCOUNTER — HOSPITAL ENCOUNTER (OUTPATIENT)
Dept: HOSPITAL 96 - M.PC | Age: 56
End: 2021-03-04
Attending: ANESTHESIOLOGY
Payer: MEDICARE

## 2021-03-04 DIAGNOSIS — M25.552: ICD-10-CM

## 2021-03-04 DIAGNOSIS — M53.3: ICD-10-CM

## 2021-03-04 DIAGNOSIS — M25.511: ICD-10-CM

## 2021-03-04 DIAGNOSIS — Z88.8: ICD-10-CM

## 2021-03-04 DIAGNOSIS — Z87.39: ICD-10-CM

## 2021-03-04 DIAGNOSIS — M19.90: ICD-10-CM

## 2021-03-04 DIAGNOSIS — R91.8: ICD-10-CM

## 2021-03-04 DIAGNOSIS — I26.90: ICD-10-CM

## 2021-03-04 DIAGNOSIS — Z79.899: ICD-10-CM

## 2021-03-04 DIAGNOSIS — M06.9: Primary | ICD-10-CM

## 2021-03-04 DIAGNOSIS — M79.10: ICD-10-CM

## 2021-03-04 DIAGNOSIS — F11.20: ICD-10-CM

## 2021-04-29 ENCOUNTER — HOSPITAL ENCOUNTER (OUTPATIENT)
Dept: HOSPITAL 96 - M.PC | Age: 56
End: 2021-04-29
Attending: ANESTHESIOLOGY
Payer: MEDICARE

## 2021-04-29 DIAGNOSIS — M25.511: ICD-10-CM

## 2021-04-29 DIAGNOSIS — M25.552: ICD-10-CM

## 2021-04-29 DIAGNOSIS — M79.18: ICD-10-CM

## 2021-04-29 DIAGNOSIS — G89.29: ICD-10-CM

## 2021-04-29 DIAGNOSIS — R91.8: ICD-10-CM

## 2021-04-29 DIAGNOSIS — Z86.16: ICD-10-CM

## 2021-04-29 DIAGNOSIS — M47.819: ICD-10-CM

## 2021-04-29 DIAGNOSIS — I26.99: Primary | ICD-10-CM

## 2021-04-29 DIAGNOSIS — Z79.891: ICD-10-CM

## 2021-04-29 DIAGNOSIS — M06.9: ICD-10-CM

## 2021-05-11 ENCOUNTER — HOSPITAL ENCOUNTER (OUTPATIENT)
Dept: HOSPITAL 35 - SJCVC | Age: 56
End: 2021-05-11
Attending: INTERNAL MEDICINE
Payer: COMMERCIAL

## 2021-05-11 DIAGNOSIS — G47.33: ICD-10-CM

## 2021-05-11 DIAGNOSIS — Z86.16: ICD-10-CM

## 2021-05-11 DIAGNOSIS — M06.9: ICD-10-CM

## 2021-05-11 DIAGNOSIS — R00.1: Primary | ICD-10-CM

## 2021-05-11 DIAGNOSIS — R06.00: ICD-10-CM

## 2021-05-11 DIAGNOSIS — Z79.899: ICD-10-CM

## 2021-05-11 DIAGNOSIS — Z82.49: ICD-10-CM

## 2021-05-11 DIAGNOSIS — E78.5: ICD-10-CM

## 2021-05-11 DIAGNOSIS — I26.99: ICD-10-CM

## 2021-05-11 DIAGNOSIS — Z72.89: ICD-10-CM

## 2021-05-11 DIAGNOSIS — I42.9: ICD-10-CM

## 2021-05-11 DIAGNOSIS — Z87.891: ICD-10-CM

## 2021-05-11 DIAGNOSIS — M05.9: ICD-10-CM

## 2021-05-11 DIAGNOSIS — Z99.89: ICD-10-CM

## 2021-05-11 DIAGNOSIS — Z79.891: ICD-10-CM

## 2021-05-11 DIAGNOSIS — F32.9: ICD-10-CM

## 2021-06-17 ENCOUNTER — HOSPITAL ENCOUNTER (OUTPATIENT)
Dept: HOSPITAL 96 - M.PC | Age: 56
End: 2021-06-17
Attending: ANESTHESIOLOGY
Payer: MEDICARE

## 2021-06-17 DIAGNOSIS — G89.29: Primary | ICD-10-CM

## 2021-06-17 DIAGNOSIS — M25.511: ICD-10-CM

## 2021-06-17 DIAGNOSIS — Z86.16: ICD-10-CM

## 2021-06-17 DIAGNOSIS — M53.3: ICD-10-CM

## 2021-06-17 DIAGNOSIS — Z79.899: ICD-10-CM

## 2021-06-17 DIAGNOSIS — I26.09: ICD-10-CM

## 2021-06-17 DIAGNOSIS — F10.10: ICD-10-CM

## 2021-06-17 DIAGNOSIS — Z88.8: ICD-10-CM

## 2021-06-17 DIAGNOSIS — M25.512: ICD-10-CM

## 2021-06-17 DIAGNOSIS — M06.9: ICD-10-CM

## 2021-06-17 DIAGNOSIS — M79.18: ICD-10-CM

## 2021-06-17 DIAGNOSIS — E04.2: ICD-10-CM

## 2021-06-17 DIAGNOSIS — M19.90: ICD-10-CM

## 2021-06-17 DIAGNOSIS — Z79.891: ICD-10-CM

## 2021-07-19 ENCOUNTER — HOSPITAL ENCOUNTER (OUTPATIENT)
Dept: HOSPITAL 35 - OR | Age: 56
Discharge: HOME | End: 2021-07-19
Attending: OTOLARYNGOLOGY
Payer: COMMERCIAL

## 2021-07-19 VITALS — BODY MASS INDEX: 31.5 KG/M2 | HEIGHT: 70.98 IN | WEIGHT: 225 LBS

## 2021-07-19 VITALS — SYSTOLIC BLOOD PRESSURE: 114 MMHG | DIASTOLIC BLOOD PRESSURE: 77 MMHG

## 2021-07-19 DIAGNOSIS — Z88.8: ICD-10-CM

## 2021-07-19 DIAGNOSIS — E78.00: ICD-10-CM

## 2021-07-19 DIAGNOSIS — F41.9: ICD-10-CM

## 2021-07-19 DIAGNOSIS — F32.9: ICD-10-CM

## 2021-07-19 DIAGNOSIS — Z79.899: ICD-10-CM

## 2021-07-19 DIAGNOSIS — Z96.651: ICD-10-CM

## 2021-07-19 DIAGNOSIS — Z98.890: ICD-10-CM

## 2021-07-19 DIAGNOSIS — M06.9: ICD-10-CM

## 2021-07-19 DIAGNOSIS — E04.1: Primary | ICD-10-CM

## 2021-07-19 DIAGNOSIS — Z90.49: ICD-10-CM

## 2021-07-19 DIAGNOSIS — K21.9: ICD-10-CM

## 2021-07-19 PROCEDURE — 56526: CPT

## 2021-07-19 PROCEDURE — 50010 RENAL EXPLORATION: CPT

## 2021-07-19 PROCEDURE — 62110: CPT

## 2021-07-19 PROCEDURE — 50101: CPT

## 2021-07-19 PROCEDURE — 52190: CPT

## 2021-07-19 PROCEDURE — 62900: CPT

## 2021-07-19 PROCEDURE — 50386 REMOVE STENT VIA TRANSURETH: CPT

## 2021-07-19 PROCEDURE — 52220: CPT

## 2021-07-19 PROCEDURE — 70005: CPT

## 2021-07-19 PROCEDURE — 57006: CPT

## 2021-07-19 PROCEDURE — 50331: CPT

## 2021-07-19 PROCEDURE — 56528: CPT

## 2021-07-19 PROCEDURE — 56524: CPT

## 2021-07-19 PROCEDURE — 50403: CPT

## 2021-08-04 NOTE — O
[facility]
[address]
[place]   [address]                     OPERATIVE REPORT              
_______________________________________________________________________________
 
Name:       JUAN C REEVES            Room #:                     REG [facility].#:      [id number]                       Account #:      [id number]  
Admission:  07/19/21    Attend Phys:    Fabian Glover MD   
Discharge:              Date of Birth:  [birth year]  
                                                          Report #: [id number]
                                                                    [id number] 
_______________________________________________________________________________
THIS REPORT FOR:  
 
cc:  Moise De La O,Fabian Golden MD                                           ~
 
 
cc:     Moise De La O DO
DATE OF SERVICE: 07/19/2021
 
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
 
PROCEDURE:  Left hemithyroidectomy with removal of substernal thyroid via 
cervical incision.
 
DESCRIPTION OF PROCEDURE:  The patient was brought to the operating room and 
placed on the operating room in supine position.  General anesthesia was 
obtained.  An orotracheal tube was inserted.  The patient was prepped and draped
for left hemithyroidectomy.  A 1% lidocaine with 1:100,000 epinephrine was 
infiltrated in the low midline neck.  The patient was prepped and draped 
sterilely for hemithyroidectomy.  A 15 blade was used to incise the skin 2 
fingerbreadths above the sternal notch and naturally occurring skin fold 
horizontally.  Subcutaneous tissues were dissected sharply through the platysma.
 Subplatysmal flap was developed superiorly to the thyroid cartilage and 
inferiorly to the sternal notch.  Strap muscles were divided in the midline.  
The thyroid isthmus was identified.  He has a very large left-sided thyroid mass
extends down into the chest. Superior pole was dissected free from the 
surrounding soft tissue.  A right angle was used to isolate the superior pole 
vessels, which were then divided utilizing a Harmonic scalpel. Dissection 
proceeded on the lateral aspect of the thyroid capsule and inferiorly into the 
chest posterior to the clavicle into the upper chest down to the innominate 
artery. The innominate was clearly palpable and the inferior lobe of the thyroid
was dissected free from the surrounding soft tissue in the chest and was then 
delivered under the clavicle and into the neck.  The inferior lobule was 
 from the remaining trachea and divided with a Harmonic scalpel and 
sent for permanent pathology.  The more natural appearing remaining thyroid 
tissue on the left side dissected laterally.  The recurrent laryngeal nerve was 
identified in its normal anatomic course and followed to its insertion point 
into the larynx.  The superior parathyroid was identified and peeled away from 
the thyroid cartilage, inferior parathyroid was not identified.  The thyroid 
lobe was then  from the trachea at Berry's ligament and divided at the 
isthmus and sent for permanent pathology.  The wound was examined for 
hemostasis, which was adequate.  A 15-French Sivakumar drain was placed into the 
wound and drained out through the lateral neck.  The wound was then closed in 
 
 
 
[facility]
[address] [place]   [address]                     OPERATIVE REPORT              
_______________________________________________________________________________
 
Name:       JUAN C REEVES            Room #:                     REG [facility] 
EDDIE#:      [id number]                       Account #:      [id number]  
Admission:  07/19/21    Attend Phys:    Fabian Glover MD   
Discharge:              Date of Birth:  [birth year]  
                                                          Report #: [id number]
                                                                    [id number] 
_______________________________________________________________________________
 
multiple layers including platysmal layer, subcutaneous with subcuticular 
sutures.  The patient was awoken from anesthesia and transferred to recovery 
room stable.
 
PLAN:   Follow up with me in 2 days for drain removal and next week for suture 
removal.  Use Norco as needed for pain.  Report if any significant bleeding, 
swelling or airway compromise.
 
 
 
 
 
 
 
 
 
 
 
 
 
 
 
 
 
 
 
 
 
 
 
 
 
 
 
 
 
 
 
 
 
 
 
 
  <ELECTRONICALLY SIGNED>
   By: Fabian Glover MD           
  08/04/21     1747
D: 07/29/21 0952                           _____________________________________
T: 07/29/21 1042                           Fabian Glover MD             /nt

## 2021-08-12 ENCOUNTER — HOSPITAL ENCOUNTER (OUTPATIENT)
Dept: HOSPITAL 96 - M.PC | Age: 56
End: 2021-08-12
Attending: ANESTHESIOLOGY
Payer: MEDICARE

## 2021-08-12 DIAGNOSIS — Z79.899: ICD-10-CM

## 2021-08-12 DIAGNOSIS — Z79.891: ICD-10-CM

## 2021-08-12 DIAGNOSIS — M25.552: ICD-10-CM

## 2021-08-12 DIAGNOSIS — M25.511: ICD-10-CM

## 2021-08-12 DIAGNOSIS — I26.99: Primary | ICD-10-CM

## 2021-08-12 DIAGNOSIS — Z86.16: ICD-10-CM

## 2021-10-07 ENCOUNTER — HOSPITAL ENCOUNTER (OUTPATIENT)
Dept: HOSPITAL 96 - M.PC | Age: 56
Discharge: HOME | End: 2021-10-07
Attending: ANESTHESIOLOGY
Payer: MEDICARE

## 2021-10-07 DIAGNOSIS — M06.9: ICD-10-CM

## 2021-10-07 DIAGNOSIS — F32.9: ICD-10-CM

## 2021-10-07 DIAGNOSIS — Z96.651: ICD-10-CM

## 2021-10-07 DIAGNOSIS — G47.30: ICD-10-CM

## 2021-10-07 DIAGNOSIS — E78.00: ICD-10-CM

## 2021-10-07 DIAGNOSIS — K21.9: ICD-10-CM

## 2021-10-07 DIAGNOSIS — Z87.891: ICD-10-CM

## 2021-10-07 DIAGNOSIS — Z88.8: ICD-10-CM

## 2021-10-07 DIAGNOSIS — Z79.899: ICD-10-CM

## 2021-10-07 DIAGNOSIS — M79.18: Primary | ICD-10-CM

## 2021-10-07 DIAGNOSIS — F41.9: ICD-10-CM

## 2021-10-07 DIAGNOSIS — Z98.890: ICD-10-CM

## 2021-12-02 ENCOUNTER — HOSPITAL ENCOUNTER (OUTPATIENT)
Dept: HOSPITAL 96 - M.PC | Age: 56
End: 2021-12-02
Attending: ANESTHESIOLOGY
Payer: MEDICARE

## 2021-12-02 DIAGNOSIS — M19.90: ICD-10-CM

## 2021-12-02 DIAGNOSIS — M25.552: ICD-10-CM

## 2021-12-02 DIAGNOSIS — B99.9: ICD-10-CM

## 2021-12-02 DIAGNOSIS — I26.99: ICD-10-CM

## 2021-12-02 DIAGNOSIS — Z79.899: ICD-10-CM

## 2021-12-02 DIAGNOSIS — M06.9: ICD-10-CM

## 2021-12-02 DIAGNOSIS — Z87.891: ICD-10-CM

## 2021-12-02 DIAGNOSIS — R91.8: ICD-10-CM

## 2021-12-02 DIAGNOSIS — G89.29: ICD-10-CM

## 2021-12-02 DIAGNOSIS — U09.9: ICD-10-CM

## 2021-12-02 DIAGNOSIS — M54.50: Primary | ICD-10-CM

## 2021-12-02 DIAGNOSIS — M79.10: ICD-10-CM

## 2022-01-27 ENCOUNTER — HOSPITAL ENCOUNTER (OUTPATIENT)
Dept: HOSPITAL 96 - M.PC | Age: 57
End: 2022-01-27
Attending: ANESTHESIOLOGY
Payer: COMMERCIAL

## 2022-01-27 DIAGNOSIS — Z86.16: ICD-10-CM

## 2022-01-27 DIAGNOSIS — M06.8A: ICD-10-CM

## 2022-01-27 DIAGNOSIS — R91.1: ICD-10-CM

## 2022-01-27 DIAGNOSIS — M19.90: ICD-10-CM

## 2022-01-27 DIAGNOSIS — Z79.899: ICD-10-CM

## 2022-01-27 DIAGNOSIS — M25.552: ICD-10-CM

## 2022-01-27 DIAGNOSIS — M79.18: ICD-10-CM

## 2022-01-27 DIAGNOSIS — M25.511: ICD-10-CM

## 2022-01-27 DIAGNOSIS — Z88.8: ICD-10-CM

## 2022-01-27 DIAGNOSIS — M54.50: Primary | ICD-10-CM

## 2022-01-27 DIAGNOSIS — I26.99: ICD-10-CM
